# Patient Record
Sex: FEMALE | Race: WHITE | NOT HISPANIC OR LATINO | URBAN - METROPOLITAN AREA
[De-identification: names, ages, dates, MRNs, and addresses within clinical notes are randomized per-mention and may not be internally consistent; named-entity substitution may affect disease eponyms.]

---

## 2018-11-21 ENCOUNTER — OFFICE VISIT (OUTPATIENT)
Dept: URBAN - METROPOLITAN AREA CLINIC 76 | Facility: CLINIC | Age: 68
End: 2018-11-21
Payer: COMMERCIAL

## 2018-11-21 DIAGNOSIS — H52.4 PRESBYOPIA: Primary | ICD-10-CM

## 2018-11-21 DIAGNOSIS — H43.812 VITREOUS DEGENERATION, LEFT EYE: ICD-10-CM

## 2018-11-21 DIAGNOSIS — H04.123 DRY EYE SYNDROME OF BILATERAL LACRIMAL GLANDS: ICD-10-CM

## 2018-11-21 PROCEDURE — 92015 DETERMINE REFRACTIVE STATE: CPT | Performed by: OPTOMETRIST

## 2018-11-21 PROCEDURE — 92014 COMPRE OPH EXAM EST PT 1/>: CPT | Performed by: OPTOMETRIST

## 2018-11-21 ASSESSMENT — INTRAOCULAR PRESSURE
OD: 17
OS: 14

## 2018-11-21 ASSESSMENT — VISUAL ACUITY
OD: 20/20
OS: 20/20

## 2018-11-21 ASSESSMENT — KERATOMETRY
OD: 47.88
OS: 47.38

## 2018-11-21 NOTE — IMPRESSION/PLAN
Impression: Vitreous degeneration, left eye: H43.812. Plan: Posterior vitreous detachment accounts for the patient's complaints. There is no evidence of retinal pathology. All signs and risks of retinal detachment and tears were discussed in detail. Patient instructed to call the office immediately if any symptoms noted.

## 2018-11-21 NOTE — IMPRESSION/PLAN
Impression: Dry eye syndrome of bilateral lacrimal glands: H04.123. Plan: Discussed diagnosis in detail with patient. Patient instructed to use emulsive base lubricant 3-4 x a day. Warm compresses with lid massage from top / down, bottom / up, and sweep from inside / out x 2. Increase omega foods and borage oil 1500-2500mg po per day.

## 2018-11-21 NOTE — IMPRESSION/PLAN
Impression: Other secondary cataract, bilateral: H26.493. OS > OD. Plan: Observe condition with no treatment required. Discussed indications for YAG in the future.

## 2020-10-29 ENCOUNTER — OFFICE VISIT (OUTPATIENT)
Dept: URBAN - METROPOLITAN AREA CLINIC 81 | Facility: CLINIC | Age: 70
End: 2020-10-29
Payer: COMMERCIAL

## 2020-10-29 DIAGNOSIS — H43.813 VITREOUS DEGENERATION, BILATERAL: ICD-10-CM

## 2020-10-29 DIAGNOSIS — H26.493 OTHER SECONDARY CATARACT, BILATERAL: Primary | ICD-10-CM

## 2020-10-29 PROCEDURE — 92014 COMPRE OPH EXAM EST PT 1/>: CPT | Performed by: OPTOMETRIST

## 2020-10-29 ASSESSMENT — INTRAOCULAR PRESSURE
OD: 16
OS: 14

## 2020-10-29 ASSESSMENT — KERATOMETRY
OS: 47.25
OD: 47.63

## 2020-10-29 ASSESSMENT — VISUAL ACUITY
OD: 20/25
OS: 20/20

## 2020-10-29 NOTE — IMPRESSION/PLAN
Impression: Other secondary cataract, bilateral: H26.493.

 - OD>OS
 - symptomatic OD w/ glare and vision changes - minimal change to spec Rx Plan: Schedule YAG preop next avail OD

## 2020-11-19 ENCOUNTER — OFFICE VISIT (OUTPATIENT)
Dept: URBAN - METROPOLITAN AREA CLINIC 76 | Facility: CLINIC | Age: 70
End: 2020-11-19
Payer: COMMERCIAL

## 2020-11-19 PROCEDURE — 92002 INTRM OPH EXAM NEW PATIENT: CPT | Performed by: OPHTHALMOLOGY

## 2020-11-19 ASSESSMENT — INTRAOCULAR PRESSURE
OS: 14
OD: 15

## 2020-11-19 ASSESSMENT — KERATOMETRY
OS: 46.63
OD: 47.75

## 2020-11-19 ASSESSMENT — VISUAL ACUITY
OD: 20/25
OS: 20/20

## 2020-11-19 NOTE — IMPRESSION/PLAN
Impression: Other secondary cataract, bilateral: H26.493. Plan: Discussed diagnosis in detail with patient. Discussed treatment options with patient. R/B/A of yag cap discussed, pt would like to proceed. Yag OD then OS ordered.  RL2

## 2021-04-05 ENCOUNTER — SURGERY (OUTPATIENT)
Dept: URBAN - METROPOLITAN AREA SURGERY 47 | Facility: SURGERY | Age: 71
End: 2021-04-05
Payer: COMMERCIAL

## 2021-04-05 PROCEDURE — 66821 AFTER CATARACT LASER SURGERY: CPT | Performed by: OPHTHALMOLOGY

## 2021-04-12 ENCOUNTER — POST-OPERATIVE VISIT (OUTPATIENT)
Dept: URBAN - METROPOLITAN AREA CLINIC 76 | Facility: CLINIC | Age: 71
End: 2021-04-12
Payer: COMMERCIAL

## 2021-04-12 DIAGNOSIS — Z48.810 ENCOUNTER FOR SURGICAL AFTERCARE FOLLOWING SURGERY ON A SENSE ORGAN: Primary | ICD-10-CM

## 2021-04-12 PROCEDURE — 99024 POSTOP FOLLOW-UP VISIT: CPT | Performed by: OPTOMETRIST

## 2021-04-12 ASSESSMENT — INTRAOCULAR PRESSURE: OD: 15

## 2021-04-12 ASSESSMENT — VISUAL ACUITY
OD: 20/25+2
OS: 20/20

## 2021-04-12 NOTE — IMPRESSION/PLAN
Impression: S/P YAG Capsulotomy (Yttrium Aluminum [place]) OD - 7 Days. Encounter for surgical aftercare following surgery on a sense organ  Z48.810. Excellent post op course Plan: Recommend using AT's 4-6x a day OU. Ok to have YAG OS done.

## 2021-04-27 ENCOUNTER — OFFICE VISIT (OUTPATIENT)
Dept: URBAN - METROPOLITAN AREA CLINIC 76 | Facility: CLINIC | Age: 71
End: 2021-04-27
Payer: COMMERCIAL

## 2021-04-27 DIAGNOSIS — Z96.1 PRESENCE OF INTRAOCULAR LENS: ICD-10-CM

## 2021-04-27 DIAGNOSIS — H26.492 OTHER SECONDARY CATARACT, LEFT EYE: ICD-10-CM

## 2021-04-27 PROCEDURE — 99212 OFFICE O/P EST SF 10 MIN: CPT | Performed by: OPTOMETRIST

## 2021-04-27 ASSESSMENT — VISUAL ACUITY
OD: 20/20
OS: 20/20

## 2021-04-27 ASSESSMENT — INTRAOCULAR PRESSURE
OS: 13
OD: 13

## 2021-04-27 ASSESSMENT — KERATOMETRY
OD: 47.75
OS: 47.13

## 2021-04-27 NOTE — IMPRESSION/PLAN
Impression: Other secondary cataract, left eye: H26.492. Plan: Pt would like to hold off on YAG PC for now and just get new MRX.

## 2022-02-03 ENCOUNTER — OFFICE VISIT (OUTPATIENT)
Dept: URBAN - METROPOLITAN AREA CLINIC 76 | Facility: CLINIC | Age: 72
End: 2022-02-03
Payer: COMMERCIAL

## 2022-02-03 PROCEDURE — 99213 OFFICE O/P EST LOW 20 MIN: CPT | Performed by: OPTOMETRIST

## 2022-02-03 ASSESSMENT — INTRAOCULAR PRESSURE
OS: 14
OD: 14

## 2022-02-03 NOTE — IMPRESSION/PLAN
Impression: Presence of intraocular lens: Z96.1. Bilateral. Plan: Stable, continue to monitor.   Needs updated dilated exam.

## 2022-02-03 NOTE — IMPRESSION/PLAN
Impression: Keratoconjunct sicca, not specified as Sjogren's, bilateral: O85.485. 
h/x arthritis, no thyroid conditions. Plan: Discussed chronic nature of condition in detail with patient. Continue Refresh artificial tears 3-4 times per day OU. Advised drops are needed daily for chronic maintenance. Warm compresses with lid massage from top / down, bottom / up, and sweep from inside / out x 2. Increase omega foods/nuts and/or Borage/Fish/Krill oil supplement 1500-2500mg capsule orally per day.

## 2022-03-31 ENCOUNTER — OFFICE VISIT (OUTPATIENT)
Dept: URBAN - METROPOLITAN AREA CLINIC 76 | Facility: CLINIC | Age: 72
End: 2022-03-31
Payer: COMMERCIAL

## 2022-03-31 DIAGNOSIS — H10.45 OTHER CHRONIC ALLERGIC CONJUNCTIVITIS: ICD-10-CM

## 2022-03-31 DIAGNOSIS — H16.223 KERATOCONJUNCT SICCA, NOT SPECIFIED AS SJOGREN'S, BILATERAL: ICD-10-CM

## 2022-03-31 PROCEDURE — 92012 INTRM OPH EXAM EST PATIENT: CPT | Performed by: OPTOMETRIST

## 2022-03-31 ASSESSMENT — INTRAOCULAR PRESSURE
OD: 15
OS: 15

## 2022-03-31 NOTE — IMPRESSION/PLAN
Impression: Keratoconjunct sicca, not specified as Sjogren's, bilateral: M39.855. 
h/x arthritis, no thyroid conditions. Plan: Rediscussed chronic nature of condition in detail with patient. Continue Refresh artificial tears 3-4 times per day OU. Advised drops are needed daily for chronic maintenance. Warm compresses with lid massage from top / down, bottom / up, and sweep from inside / out x 2. Increase omega foods/nuts and/or Borage/Fish/Krill oil supplement 1500-2500mg capsule orally per day.